# Patient Record
Sex: FEMALE | Race: WHITE
[De-identification: names, ages, dates, MRNs, and addresses within clinical notes are randomized per-mention and may not be internally consistent; named-entity substitution may affect disease eponyms.]

---

## 2020-07-13 ENCOUNTER — HOSPITAL ENCOUNTER (EMERGENCY)
Dept: HOSPITAL 50 - VM.ED | Age: 30
Discharge: HOME | End: 2020-07-13
Payer: COMMERCIAL

## 2020-07-13 DIAGNOSIS — S01.81XA: ICD-10-CM
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
## 2020-07-13 DIAGNOSIS — Z91.09: ICD-10-CM

## 2020-07-13 DIAGNOSIS — S09.90XA: Primary | ICD-10-CM

## 2020-07-13 NOTE — EDM.PDOC
ED HPI GENERAL MEDICAL PROBLEM





- General


Chief Complaint: Laceration


Stated Complaint: FALL FROM HORSE


Time Seen by Provider: 07/13/20 18:50


Source of Information: Reports: Patient, Family


History Limitations: Reports: No Limitations





- History of Present Illness


INITIAL COMMENTS - FREE TEXT/NARRATIVE: 





Patient states approximate about 45 minutes ago she was thrown forward on her 

horse hitting his neck and it was bucked off.  She states she did hit the ground

and was dazed for just a few seconds but unsure if she blacked out but she was 

able to get up and walk to the house afterwards.  She now has a chief complaint 

of laceration above her right eye and her right ankle hurting.  She states she 

got a little nauseated when she saw her blood which is her normal after she cove

red up in a few minutes later it went away.





She has no other complaints at this time last tetanus was a few months ago she 

has no medical problems she did have a prior head injury several years ago but 

no issues since.








Onset: Today, Sudden


Duration: Minutes:


Location: Reports: Neck, Lower Extremity, Right


Severity: Mild


Improves with: Reports: None


Worsens with: Reports: None


Associated Symptoms: Reports: Nausea/Vomiting.  Denies: Confusion, Chest Pain, 

Diaphoresis, Headaches, Shortness of Breath, Syncope, Weakness





- Related Data


                                    Allergies











Allergy/AdvReac Type Severity Reaction Status Date / Time


 


medical tape Allergy  Other Uncoded 07/13/20 19:04











Home Meds: 


                                    Home Meds





Ethinyl Estradiol/Drospirenone [Drospirenone-Ee 3-0.03 mg Tab] 1 each PO DAILY 

07/13/20 [History]











Past Medical History





- Past Health History


Medical/Surgical History: Denies Medical/Surgical History





Social & Family History





- Tobacco Use


Smoking Status *Q: Unknown Ever Smoked





ED ROS GENERAL





- Review of Systems


Review Of Systems: See Below


Constitutional: Reports: No Symptoms.  Denies: Weakness


HEENT: Denies: Dental Pain, Ear Pain, Eye Pain, Nosebleed, Sinus Problem, Throat

 Pain, Throat Swelling


Respiratory: Reports: No Symptoms.  Denies: Shortness of Breath, Cough


Cardiovascular: Reports: No Symptoms.  Denies: Chest Pain


Endocrine: Reports: No Symptoms


GI/Abdominal: Reports: Nausea.  Denies: Abdominal Pain


: Reports: No Symptoms


Musculoskeletal: Reports: Neck Pain, Foot Pain


Skin: Reports: No Symptoms


Neurological: Reports: No Symptoms


Psychiatric: Reports: No Symptoms


Hematologic/Lymphatic: Reports: No Symptoms


Immunologic: Reports: No Symptoms





ED EXAM, SKIN/RASH


Exam: See Below


Exam Limited By: No Limitations


General Appearance: Alert, WD/WN, No Apparent Distress


Eye Exam: Bilateral Eye: EOMI, PERRL, Other (No tenderness to palpation over the

 globes the orbits of the sinus cavities)


Ears: Normal External Exam, Normal Canal, Hearing Grossly Normal, Normal TMs, 

Other (No blood noted behind the tympanic membranes)


Nose: Normal Inspection, Normal Mucosa, No Blood


Throat/Mouth: Normal Inspection, Normal Lips, Normal Teeth, Normal Gums, Normal 

Oropharynx, Normal Voice, No Airway Compromise


Head: Atraumatic, Normocephalic.  No: Facial Swelling, Facial Tenderness


Neck: Normal Inspection, Supple, Full Range of Motion, Other (Patient noted to 

be moving neck with full range of motion upon entering the room she does have 

tenderness to palpation over the C5 area there is no noted step-offs no crepitus

 no abrasions or ecchymosis).  No: Non-Tender


Respiratory/Chest: No Respiratory Distress, Lungs Clear, Normal Breath Sounds, 

No Accessory Muscle Use, Chest Non-Tender


Cardiovascular: Normal Peripheral Pulses, Regular Rate, Rhythm, No Edema, No 

Gallop, No JVD, No Murmur, No Rub


GI/Abdominal: Normal Bowel Sounds, Soft, Non-Tender, No Organomegaly, No 

Distention, No Abnormal Bruit, No Mass, Pelvis Stable


Back Exam: Normal Inspection, Full Range of Motion.  No: Vertebral Tenderness


Extremities: Normal Inspection, Normal Range of Motion, Other (Patient has mild 

tenderness palpation over the right lateral malleolus no tenderness palpation 

over the medial malleolus navicular no tenderness palpation over the proximal 

tibial plateau area she has neurovascular intact with all extremities equal soft

 touch sensation normal pulses).  No: Non-Tender


Neurological: Alert, Oriented, CN II-XII Intact, Normal Cognition, Normal Gait, 

Normal Reflexes, No Motor/Sensory Deficits, Other (Cranial nerves II through XII

 are intact patient is alert and oriented x4 with normal conversation logical 

thought process asking for something to drink states she is currently hungry)


Psychiatric: Normal Affect, Normal Mood


Skin: Warm, Dry, Intact, Normal Color, No Rash, Other (Linear laceration just a

kyree the right eyebrow approximately 5 cm x 3 mm x 3 mm)





Course





- Vital Signs


Text/Narrative:: 





CT head was held secondary to prior multiple head CTs with injury in the past 

patient is alert and oriented at this time plain film ordered of the C-spine and

 ankle patient will be observed given p.o. and reassessed








X-ray of ankle no acute findings C-spine no acute findings noted degenerative 

changes C5 and 6








Laceration was irrigated normal saline and cleaned with Hibiclens was closed 

with well approximation with Dermabond and Steri-Strips








Patient and mother were given signs and symptoms of head injury and need to 

return to the emergency room as well as wound care instructions both give verbal

 understanding patient also has a roommate that is a nurse








Patient was rechecked several times cranial nerves II through XII are intact 

there is no nausea no vomiting with the patient she is holding p.o. all vital 

signs are stable within normal limits


Last Recorded V/S: 


                                Last Vital Signs











Temp  36.4 C   07/13/20 18:50


 


Pulse  61   07/13/20 18:50


 


Resp  18   07/13/20 18:50


 


BP  109/62   07/13/20 18:50


 


Pulse Ox  98   07/13/20 18:50














Departure





- Departure


Time of Disposition: 20:15


Disposition: Home, Self-Care 01


Condition: Good


Clinical Impression: 


 Laceration of forehead without complication, Head injury due to trauma








- Discharge Information


*PRESCRIPTION DRUG MONITORING PROGRAM REVIEWED*: No


*COPY OF PRESCRIPTION DRUG MONITORING REPORT IN PATIENT LILIAM: No


Referrals: 


Ronal Loco PA-C [Primary Care Provider] - 


Forms:  ED Department Discharge





Sepsis Event Note (ED)





- Evaluation


Sepsis Screening Result: No Definite Risk





- Focused Exam


Vital Signs: 


                                   Vital Signs











  Temp Pulse Resp BP Pulse Ox


 


 07/13/20 18:50  36.4 C  61  18  109/62  98














- Problem List & Annotations


(1) Head injury due to trauma


SNOMED Code(s): 01708443


   Code(s): S09.90XA - UNSPECIFIED INJURY OF HEAD, INITIAL ENCOUNTER   Status: 

Acute   Current Visit: Yes   





(2) Laceration of forehead without complication


SNOMED Code(s): 689716751


   Code(s): S01.81XA - LACERATION W/O FOREIGN BODY OF OTH PART OF HEAD, INIT 

ENCNTR   Status: Acute   Current Visit: Yes

## 2020-07-13 NOTE — CR
______________________________________________________________________________   

  

[id number] RAD/RAD Cervical Spine 2-3V  

EXAM:   

   

 RAD Cervical Spine 2-3V  

   

 CLINICAL DATA:   

   

 TRAUMA  

   

 COMPARISON:   

   

 NO PREVIOUS SIMILAR EXAM IS AVAILABLE.  

   

 FINDINGS:   

   

 No fracture or subluxation is seen  

   

 There are early degenerative changes involving C5-C6.  

   

 The prevertebral soft tissues are unremarkable   

   

 IMPRESSION:  

   

 NO FRACTURE OR SUBLUXATION  

   

 Electronically signed by Brendan Ho MD on 7/13/2020 7:43 PM  

   

  

Brendan Ho MD                 

 07/13/20 [id number]    

  

Thank you for allowing us to participate in the care of your patient.

## 2020-07-13 NOTE — CR
______________________________________________________________________________   

  

[id number] RAD/RAD Ankle Right 3V Min  

EXAM:   

   

 RAD Ankle Right 3V Min  

   

 CLINICAL DATA:   

   

 TRAUMA  

   

 COMPARISON:   

   

 NO PREVIOUS SIMILAR EXAM IS AVAILABLE.  

   

 FINDINGS:   

   

 No fracture or dislocation is seen.  

   

 There is no radiopaque foreign body in the soft tissues.  

   

 There is no air in the soft tissues.  

   

 There is no cortical thickening or periosteal reaction either.  

   

 IMPRESSION:  

   

 NEGATIVE PLAIN FILM EXAM.  

   

 Electronically signed by Brendan Ho MD on 7/13/2020 7:42 PM  

   

  

Brendan Ho MD                 

 07/13/20 [id number]    

  

Thank you for allowing us to participate in the care of your patient.